# Patient Record
Sex: FEMALE | URBAN - METROPOLITAN AREA
[De-identification: names, ages, dates, MRNs, and addresses within clinical notes are randomized per-mention and may not be internally consistent; named-entity substitution may affect disease eponyms.]

---

## 2020-06-22 ENCOUNTER — OUTPATIENT (OUTPATIENT)
Dept: OUTPATIENT SERVICES | Facility: HOSPITAL | Age: 65
LOS: 1 days | End: 2020-06-22
Payer: MEDICAID

## 2020-06-22 ENCOUNTER — RESULT REVIEW (OUTPATIENT)
Age: 65
End: 2020-06-22

## 2020-06-22 DIAGNOSIS — Z17.0 ESTROGEN RECEPTOR POSITIVE STATUS [ER+]: ICD-10-CM

## 2020-06-22 DIAGNOSIS — Z80.3 FAMILY HISTORY OF MALIGNANT NEOPLASM OF BREAST: ICD-10-CM

## 2020-06-22 DIAGNOSIS — C50.011 MALIGNANT NEOPLASM OF NIPPLE AND AREOLA, RIGHT FEMALE BREAST: ICD-10-CM

## 2020-06-22 PROCEDURE — 88321 CONSLTJ&REPRT SLD PREP ELSWR: CPT

## 2020-06-24 ENCOUNTER — OUTPATIENT (OUTPATIENT)
Dept: OUTPATIENT SERVICES | Facility: HOSPITAL | Age: 65
LOS: 1 days | End: 2020-06-24
Payer: MEDICAID

## 2020-06-24 VITALS
HEIGHT: 63.5 IN | SYSTOLIC BLOOD PRESSURE: 162 MMHG | OXYGEN SATURATION: 98 % | RESPIRATION RATE: 16 BRPM | WEIGHT: 179.9 LBS | HEART RATE: 90 BPM | DIASTOLIC BLOOD PRESSURE: 94 MMHG | TEMPERATURE: 99 F

## 2020-06-24 DIAGNOSIS — Z80.3 FAMILY HISTORY OF MALIGNANT NEOPLASM OF BREAST: ICD-10-CM

## 2020-06-24 DIAGNOSIS — Z17.0 ESTROGEN RECEPTOR POSITIVE STATUS [ER+]: ICD-10-CM

## 2020-06-24 DIAGNOSIS — Z98.890 OTHER SPECIFIED POSTPROCEDURAL STATES: Chronic | ICD-10-CM

## 2020-06-24 DIAGNOSIS — C50.011 MALIGNANT NEOPLASM OF NIPPLE AND AREOLA, RIGHT FEMALE BREAST: ICD-10-CM

## 2020-06-24 DIAGNOSIS — Z01.818 ENCOUNTER FOR OTHER PREPROCEDURAL EXAMINATION: ICD-10-CM

## 2020-06-24 LAB
ANION GAP SERPL CALC-SCNC: 11 MMOL/L — SIGNIFICANT CHANGE UP (ref 5–17)
BUN SERPL-MCNC: 22 MG/DL — SIGNIFICANT CHANGE UP (ref 7–23)
CALCIUM SERPL-MCNC: 8.9 MG/DL — SIGNIFICANT CHANGE UP (ref 8.4–10.5)
CHLORIDE SERPL-SCNC: 106 MMOL/L — SIGNIFICANT CHANGE UP (ref 96–108)
CO2 SERPL-SCNC: 26 MMOL/L — SIGNIFICANT CHANGE UP (ref 22–31)
CREAT SERPL-MCNC: 0.98 MG/DL — SIGNIFICANT CHANGE UP (ref 0.5–1.3)
GLUCOSE SERPL-MCNC: 101 MG/DL — HIGH (ref 70–99)
HCT VFR BLD CALC: 36.8 % — SIGNIFICANT CHANGE UP (ref 34.5–45)
HGB BLD-MCNC: 11.9 G/DL — SIGNIFICANT CHANGE UP (ref 11.5–15.5)
MCHC RBC-ENTMCNC: 29.8 PG — SIGNIFICANT CHANGE UP (ref 27–34)
MCHC RBC-ENTMCNC: 32.3 GM/DL — SIGNIFICANT CHANGE UP (ref 32–36)
MCV RBC AUTO: 92 FL — SIGNIFICANT CHANGE UP (ref 80–100)
NRBC # BLD: 0 /100 WBCS — SIGNIFICANT CHANGE UP (ref 0–0)
PLATELET # BLD AUTO: 241 K/UL — SIGNIFICANT CHANGE UP (ref 150–400)
POTASSIUM SERPL-MCNC: 3.7 MMOL/L — SIGNIFICANT CHANGE UP (ref 3.5–5.3)
POTASSIUM SERPL-SCNC: 3.7 MMOL/L — SIGNIFICANT CHANGE UP (ref 3.5–5.3)
RBC # BLD: 4 M/UL — SIGNIFICANT CHANGE UP (ref 3.8–5.2)
RBC # FLD: 12.8 % — SIGNIFICANT CHANGE UP (ref 10.3–14.5)
SODIUM SERPL-SCNC: 143 MMOL/L — SIGNIFICANT CHANGE UP (ref 135–145)
WBC # BLD: 5.23 K/UL — SIGNIFICANT CHANGE UP (ref 3.8–10.5)
WBC # FLD AUTO: 5.23 K/UL — SIGNIFICANT CHANGE UP (ref 3.8–10.5)

## 2020-06-24 PROCEDURE — 93010 ELECTROCARDIOGRAM REPORT: CPT | Mod: NC

## 2020-06-24 PROCEDURE — 80048 BASIC METABOLIC PNL TOTAL CA: CPT

## 2020-06-24 PROCEDURE — 85027 COMPLETE CBC AUTOMATED: CPT

## 2020-06-24 PROCEDURE — 36415 COLL VENOUS BLD VENIPUNCTURE: CPT

## 2020-06-24 PROCEDURE — G0463: CPT

## 2020-06-24 PROCEDURE — 93005 ELECTROCARDIOGRAM TRACING: CPT

## 2020-06-24 NOTE — H&P PST ADULT - NSICDXPASTMEDICALHX_GEN_ALL_CORE_FT
PAST MEDICAL HISTORY:  GERD (gastroesophageal reflux disease)     Seasonal allergies PAST MEDICAL HISTORY:  GERD (gastroesophageal reflux disease)     History of herpes genitalis     HPV in female     Mitral valve prolapse     Seasonal allergies

## 2020-06-24 NOTE — H&P PST ADULT - NSANTHOSAYNRD_GEN_A_CORE
No. LUBA screening performed.  STOP BANG Legend: 0-2 = LOW Risk; 3-4 = INTERMEDIATE Risk; 5-8 = HIGH Risk/neck 13.75 inches

## 2020-06-24 NOTE — H&P PST ADULT - HISTORY OF PRESENT ILLNESS
65 y/o female presents for PST.  Patient found a lump on her right breast in 2/2020 and went for follow up PMD.  Biopsy 5/2020 showed malignancy and now scheduled for right breast wide resection, right sentinel lymph node biopsy with Dr Berry on 06/29/2020.  Scheduled for COVID 19 testing 06/26/2020 at [address] 65 y/o female presents with PMH of GERD for PST.  Patient found a lump on her right breast in 2/2020 and went for follow up PMD.  Biopsy 5/2020 showed malignancy and now scheduled for right breast wide resection, right sentinel lymph node biopsy with Dr Berry on 06/29/2020.  Scheduled for COVID 19 testing 06/26/2020 at [address]

## 2020-06-24 NOTE — H&P PST ADULT - VISION (WITH CORRECTIVE LENSES IF THE PATIENT USUALLY WEARS THEM):
Bedside and Verbal shift change report given to Hermelinda/Sister Nahun RN (oncoming nurse) by Dominic Richardson RN (offgoing nurse). Report included the following information SBAR, Kardex, Procedure Summary and MAR. Assessment completed. Patient AAOX4. Dual skin assessment completed. Bed locked and at lowest position. Call bell within reach. Normal vision: sees adequately in most situations; can see medication labels, newsprint

## 2020-06-25 DIAGNOSIS — Z01.818 ENCOUNTER FOR OTHER PREPROCEDURAL EXAMINATION: ICD-10-CM

## 2020-06-26 ENCOUNTER — APPOINTMENT (OUTPATIENT)
Dept: DISASTER EMERGENCY | Facility: CLINIC | Age: 65
End: 2020-06-26

## 2020-06-26 LAB
SARS-COV-2 N GENE NPH QL NAA+PROBE: NOT DETECTED
SURGICAL PATHOLOGY STUDY: SIGNIFICANT CHANGE UP

## 2020-06-29 ENCOUNTER — RESULT REVIEW (OUTPATIENT)
Age: 65
End: 2020-06-29

## 2020-06-29 ENCOUNTER — OUTPATIENT (OUTPATIENT)
Dept: OUTPATIENT SERVICES | Facility: HOSPITAL | Age: 65
LOS: 1 days | End: 2020-06-29
Payer: MEDICAID

## 2020-06-29 VITALS
HEIGHT: 63.5 IN | DIASTOLIC BLOOD PRESSURE: 92 MMHG | RESPIRATION RATE: 16 BRPM | SYSTOLIC BLOOD PRESSURE: 161 MMHG | HEART RATE: 91 BPM | WEIGHT: 179.9 LBS | TEMPERATURE: 99 F | OXYGEN SATURATION: 98 %

## 2020-06-29 VITALS
TEMPERATURE: 99 F | SYSTOLIC BLOOD PRESSURE: 115 MMHG | HEART RATE: 85 BPM | DIASTOLIC BLOOD PRESSURE: 80 MMHG | RESPIRATION RATE: 16 BRPM | OXYGEN SATURATION: 97 %

## 2020-06-29 DIAGNOSIS — Z98.890 OTHER SPECIFIED POSTPROCEDURAL STATES: Chronic | ICD-10-CM

## 2020-06-29 DIAGNOSIS — C50.011 MALIGNANT NEOPLASM OF NIPPLE AND AREOLA, RIGHT FEMALE BREAST: ICD-10-CM

## 2020-06-29 DIAGNOSIS — Z17.0 ESTROGEN RECEPTOR POSITIVE STATUS [ER+]: ICD-10-CM

## 2020-06-29 DIAGNOSIS — Z80.3 FAMILY HISTORY OF MALIGNANT NEOPLASM OF BREAST: ICD-10-CM

## 2020-06-29 PROCEDURE — 38900 IO MAP OF SENT LYMPH NODE: CPT | Mod: AS,RT

## 2020-06-29 PROCEDURE — 88307 TISSUE EXAM BY PATHOLOGIST: CPT | Mod: 26

## 2020-06-29 PROCEDURE — 88305 TISSUE EXAM BY PATHOLOGIST: CPT | Mod: 26

## 2020-06-29 PROCEDURE — 88333 PATH CONSLTJ SURG CYTO XM 1: CPT

## 2020-06-29 PROCEDURE — 88333 PATH CONSLTJ SURG CYTO XM 1: CPT | Mod: 26

## 2020-06-29 PROCEDURE — 38525 BIOPSY/REMOVAL LYMPH NODES: CPT | Mod: RT

## 2020-06-29 PROCEDURE — 88307 TISSUE EXAM BY PATHOLOGIST: CPT

## 2020-06-29 PROCEDURE — 88305 TISSUE EXAM BY PATHOLOGIST: CPT

## 2020-06-29 PROCEDURE — 19301 PARTIAL MASTECTOMY: CPT | Mod: RT

## 2020-06-29 PROCEDURE — 19301 PARTIAL MASTECTOMY: CPT | Mod: AS,78,RT

## 2020-06-29 RX ORDER — ESOMEPRAZOLE MAGNESIUM 40 MG/1
1 CAPSULE, DELAYED RELEASE ORAL
Qty: 0 | Refills: 0 | DISCHARGE

## 2020-06-29 RX ORDER — ASCORBIC ACID 60 MG
1 TABLET,CHEWABLE ORAL
Qty: 0 | Refills: 0 | DISCHARGE

## 2020-06-29 RX ORDER — VALACYCLOVIR 500 MG/1
0 TABLET, FILM COATED ORAL
Qty: 0 | Refills: 0 | DISCHARGE

## 2020-06-29 RX ORDER — ONDANSETRON 8 MG/1
4 TABLET, FILM COATED ORAL ONCE
Refills: 0 | Status: DISCONTINUED | OUTPATIENT
Start: 2020-06-29 | End: 2020-06-29

## 2020-06-29 RX ORDER — HYDROMORPHONE HYDROCHLORIDE 2 MG/ML
1 INJECTION INTRAMUSCULAR; INTRAVENOUS; SUBCUTANEOUS
Refills: 0 | Status: DISCONTINUED | OUTPATIENT
Start: 2020-06-29 | End: 2020-06-29

## 2020-06-29 RX ORDER — HYDROMORPHONE HYDROCHLORIDE 2 MG/ML
0.5 INJECTION INTRAMUSCULAR; INTRAVENOUS; SUBCUTANEOUS
Refills: 0 | Status: DISCONTINUED | OUTPATIENT
Start: 2020-06-29 | End: 2020-06-29

## 2020-06-29 RX ORDER — ACETAMINOPHEN 500 MG
1 TABLET ORAL
Qty: 0 | Refills: 0 | DISCHARGE

## 2020-06-29 RX ORDER — SODIUM CHLORIDE 9 MG/ML
1000 INJECTION, SOLUTION INTRAVENOUS
Refills: 0 | Status: DISCONTINUED | OUTPATIENT
Start: 2020-06-29 | End: 2020-06-29

## 2020-06-29 RX ORDER — L.ACIDOPH/B.ANIMALIS/B.LONGUM 15B CELL
1 CAPSULE ORAL
Qty: 0 | Refills: 0 | DISCHARGE

## 2020-06-29 RX ORDER — MULTIVIT-MIN/FERROUS GLUCONATE 9 MG/15 ML
1 LIQUID (ML) ORAL
Qty: 0 | Refills: 0 | DISCHARGE

## 2020-06-29 RX ORDER — FLUTICASONE PROPIONATE 50 MCG
1 SPRAY, SUSPENSION NASAL
Qty: 0 | Refills: 0 | DISCHARGE

## 2020-06-29 RX ADMIN — SODIUM CHLORIDE 50 MILLILITER(S): 9 INJECTION, SOLUTION INTRAVENOUS at 09:59

## 2020-06-29 NOTE — ASU DISCHARGE PLAN (ADULT/PEDIATRIC) - NURSING INSTRUCTIONS
All discharge instructions reviewed with patient including pain ,safety, medication . care and emptying of J{P drain and follow up care. Pt acknowledges understanding of discharge instructions

## 2020-06-29 NOTE — BRIEF OPERATIVE NOTE - NSICDXBRIEFPROCEDURE_GEN_ALL_CORE_FT
PROCEDURES:  Partial mastectomy of right breast with sentinel lymph node biopsy 29-Jun-2020 13:14:48 right axillary dissection Marleny Lopez  Resection of right breast 29-Jun-2020 13:13:05 wide resection right breast Marleny Lopez

## 2020-06-29 NOTE — BRIEF OPERATIVE NOTE - NSICDXBRIEFPOSTOP_GEN_ALL_CORE_FT
POST-OP DIAGNOSIS:  Breast cancer 29-Jun-2020 13:12:20 rght breast intraductal carcinoma Marleny Lopez

## 2020-06-29 NOTE — ASU PATIENT PROFILE, ADULT - PMH
GERD (gastroesophageal reflux disease)    History of herpes genitalis    HPV in female    Mitral valve prolapse    Seasonal allergies

## 2020-06-29 NOTE — ASU DISCHARGE PLAN (ADULT/PEDIATRIC) - ASU DC SPECIAL INSTRUCTIONSFT
Keep dressings dry for 48 hours .  Remove outer dressings leave steri strips in place.  Wear bra at all times for at least 2 weeks .    Empty and record drainage - make an appointment for Dr Berry when ELTON drain is less than 30 cc in   24 hours.  Call office for appoinment .  Do not wet drain site.  Take medications as directed. - Drink plenty of fluids and take a stool softener or laxative while   taking a narcotic .  Ice pack to breast for comfort as needed

## 2020-06-29 NOTE — ASU DISCHARGE PLAN (ADULT/PEDIATRIC) - CARE PROVIDER_API CALL
Aura Berry AND [facility]  [address], [address]  [place] [address]  Phone: [phone]  Fax: [phone]  Follow Up Time: 2 weeks

## 2020-07-02 LAB — SURGICAL PATHOLOGY STUDY: SIGNIFICANT CHANGE UP

## 2020-07-10 ENCOUNTER — OUTPATIENT (OUTPATIENT)
Dept: OUTPATIENT SERVICES | Facility: HOSPITAL | Age: 65
LOS: 1 days | Discharge: HOME | End: 2020-07-10
Payer: MEDICAID

## 2020-07-10 ENCOUNTER — RESULT REVIEW (OUTPATIENT)
Age: 65
End: 2020-07-10

## 2020-07-10 ENCOUNTER — TRANSCRIPTION ENCOUNTER (OUTPATIENT)
Age: 65
End: 2020-07-10

## 2020-07-10 DIAGNOSIS — Z98.890 OTHER SPECIFIED POSTPROCEDURAL STATES: Chronic | ICD-10-CM

## 2020-07-10 DIAGNOSIS — C50.919 MALIGNANT NEOPLASM OF UNSPECIFIED SITE OF UNSPECIFIED FEMALE BREAST: ICD-10-CM

## 2020-07-13 ENCOUNTER — APPOINTMENT (OUTPATIENT)
Dept: HEMATOLOGY ONCOLOGY | Facility: CLINIC | Age: 65
End: 2020-07-13
Payer: MEDICAID

## 2020-07-13 DIAGNOSIS — C50.911 MALIGNANT NEOPLASM OF UNSPECIFIED SITE OF RIGHT FEMALE BREAST: ICD-10-CM

## 2020-07-13 DIAGNOSIS — Z86.79 PERSONAL HISTORY OF OTHER DISEASES OF THE CIRCULATORY SYSTEM: ICD-10-CM

## 2020-07-13 DIAGNOSIS — Z80.1 FAMILY HISTORY OF MALIGNANT NEOPLASM OF TRACHEA, BRONCHUS AND LUNG: ICD-10-CM

## 2020-07-13 DIAGNOSIS — Z78.9 OTHER SPECIFIED HEALTH STATUS: ICD-10-CM

## 2020-07-13 DIAGNOSIS — Z17.0 MALIGNANT NEOPLASM OF UNSPECIFIED SITE OF RIGHT FEMALE BREAST: ICD-10-CM

## 2020-07-13 PROBLEM — K21.9 GASTRO-ESOPHAGEAL REFLUX DISEASE WITHOUT ESOPHAGITIS: Chronic | Status: ACTIVE | Noted: 2020-06-24

## 2020-07-13 PROBLEM — Z86.19 PERSONAL HISTORY OF OTHER INFECTIOUS AND PARASITIC DISEASES: Chronic | Status: ACTIVE | Noted: 2020-06-24

## 2020-07-13 PROBLEM — J30.2 OTHER SEASONAL ALLERGIC RHINITIS: Chronic | Status: ACTIVE | Noted: 2020-06-24

## 2020-07-13 PROBLEM — I34.1 NONRHEUMATIC MITRAL (VALVE) PROLAPSE: Chronic | Status: ACTIVE | Noted: 2020-06-24

## 2020-07-13 PROBLEM — B97.7 PAPILLOMAVIRUS AS THE CAUSE OF DISEASES CLASSIFIED ELSEWHERE: Chronic | Status: ACTIVE | Noted: 2020-06-24

## 2020-07-13 LAB — SURGICAL PATHOLOGY STUDY: SIGNIFICANT CHANGE UP

## 2020-07-13 PROCEDURE — 99205 OFFICE O/P NEW HI 60 MIN: CPT | Mod: 95

## 2020-07-14 PROBLEM — Z80.1 FAMILY HISTORY OF LUNG CANCER: Status: ACTIVE | Noted: 2020-07-14

## 2020-07-14 PROBLEM — Z78.9 NON-SMOKER: Status: ACTIVE | Noted: 2020-07-14

## 2020-07-14 PROBLEM — Z86.79 HISTORY OF MITRAL VALVE PROLAPSE: Status: RESOLVED | Noted: 2020-07-14 | Resolved: 2020-07-14

## 2020-07-14 RX ORDER — ALPRAZOLAM 0.25 MG/1
0.25 TABLET ORAL
Qty: 2 | Refills: 0 | Status: ACTIVE | COMMUNITY
Start: 2020-06-04

## 2020-07-14 RX ORDER — MULTIVITAMIN/IRON/FOLIC ACID 18MG-0.4MG
500-400 TABLET ORAL
Refills: 0 | Status: ACTIVE | COMMUNITY

## 2020-07-14 RX ORDER — FLUTICASONE PROPIONATE 50 UG/1
50 SPRAY, METERED NASAL
Refills: 0 | Status: ACTIVE | COMMUNITY

## 2020-07-14 RX ORDER — MULTIVIT-MIN/IRON/FOLIC ACID/K 18-600-40
500 CAPSULE ORAL
Refills: 0 | Status: ACTIVE | COMMUNITY

## 2020-07-14 RX ORDER — ECHINACEA 400 MG
CAPSULE ORAL
Refills: 0 | Status: ACTIVE | COMMUNITY

## 2020-07-14 RX ORDER — TRAMADOL HYDROCHLORIDE 50 MG/1
50 TABLET, COATED ORAL
Qty: 20 | Refills: 0 | Status: ACTIVE | COMMUNITY
Start: 2020-06-24

## 2020-07-14 RX ORDER — VALACYCLOVIR 500 MG/1
500 TABLET, FILM COATED ORAL
Qty: 30 | Refills: 0 | Status: ACTIVE | COMMUNITY
Start: 2020-03-19

## 2020-07-14 RX ORDER — MULTIVITAMIN,THER AND MINERALS
TABLET ORAL
Refills: 0 | Status: ACTIVE | COMMUNITY

## 2020-07-15 NOTE — PHYSICAL EXAM
[Fully active, able to carry on all pre-disease performance without restriction] : Status 0 - Fully active, able to carry on all pre-disease performance without restriction [Normal] : affect appropriate [de-identified] : normal respiratory effort, able to speak full sentences without gasping or wheezing

## 2020-07-15 NOTE — CONSULT LETTER
[Dear  ___] : Dear  [unfilled], [Consult Letter:] : I had the pleasure of evaluating your patient, [unfilled]. [( Thank you for referring [unfilled] for consultation for _____ )] : Thank you for referring [unfilled] for consultation for [unfilled] [Please see my note below.] : Please see my note below. [Sincerely,] : Sincerely, [Consult Closing:] : Thank you very much for allowing me to participate in the care of this patient.  If you have any questions, please do not hesitate to contact me. [DrCaitie  ___] : Dr. ELISE [FreeTextEntry2] : Héctor Barreto MD\par [address]\par [place] [id number] [FreeTextEntry3] : Greg Huddleston MD\par Attending\par [facility]\par

## 2020-07-15 NOTE — HISTORY OF PRESENT ILLNESS
[Home] : at home, [unfilled] , at the time of the visit. [Medical Office: ([facility])___] : at the medical office located in  [Verbal consent obtained from patient] : the patient, [unfilled] [Disease: _____________________] : Disease: [unfilled] [T: ___] : T[unfilled] [N: ___] : N[unfilled] [AJCC Stage: ____] : AJCC Stage: [unfilled] [de-identified] : Age 64: right breast cancer\par Palpable right breast lump: she underwent diagnostic R mammogram on 11/4/56 which showed no definite abnormality but u/s showed hypoechoic nodule in the right breast: 7:00 13 x 4 mm nodule. Breast biopsy done 5/21/2020 showed infiltrating ductal carcinoma that was ER positive, LA positive, and Mtd9zjy positive: 3+. She had breast excision on 5/26/2020 which showed infiltrating ductal carcinoma measuring at least 1.5 cm. MRI of the breast done on 6/10/2020 which showed retroareolar right breast enhancement at site of biopsy proven carcinoma and no suspicious enhancement of the left breast or axillary adenopathy. She underwent lumpectomy with sentinel lymph node biopsy and axillary lymph node dissection with Dr Berry on 6/29/2020. Pathology showed focal invasive residual ductal carcinoma 0.2 cm, grade 3, positive sentinel lymph node: 0.9 cm with additional lymph nodes 6 negative for carcinoma, and  focal DCIS.  [de-identified] : invasive ductal carcinoma ER positive, AZ positive, Udf2eyy positive  [de-identified] : Due to coronavirus pandemic, telehealth visit made to decrease patient exposure. She consented to telehealth visit.\par She has been recovering from surgery. She has been healthy with FH of lung cancer but parents were smokers. She has MVP but denies any palpitations or heart issues. She denies any recent Echo. She is present to review adjuvant recommendations. She will also get a second opinion with Dr Zahira Menard.  [de-identified] : Huma Genetic testing done on 6/17/2020 was negative

## 2020-07-15 NOTE — ASSESSMENT
[FreeTextEntry1] : 65 y/o  BRCA negative F with Stage IA (T1N1) right breast cancer that is ER positive, MS positive, Dfl2ldu negative. We reviewed her pathology and reviewed the significance of ER positive/ Dxr7ijg positive/ LN positive breast cancer. We reviewed the role of chemotherapy with Tyy4yah blocking medication to improve disease free survival. We reviewed different Ywe8xol blocking regimens but recommended TCHP given her LN positive disease.  We would not recommend ACTHP since she only has one positive LN. We would recommend TCHP as adjuvant chemotherapy if her EF from Echo is WNL.  We reviewed TCHP every 3 weeks for 6 cycles followed by maintenance trastuzumab/ pertuzumab therapy. We reviewed the potential side effects of chemotherapy including but not limited to: fatigue, low blood counts, allergic reaction, nausea, vomiting, diarrhea, arthralgias, hair loss, numbness and tingling of the hands and feet, increased risk of infection, and low potassium/ magnesium. We reviewed the risk of cardiotoxicity and how we would monitor heart function baseline and every 3 months. We reviewed modifications to therapy if she does not tolerate TCHP. Questions were answered to her satisfaction. She understands role of adjuvant chemotherapy and agreeable. We will order 2 D Echo. She will have second opinion and let us know where she would like to treat. \par \par We reviewed sequencing of therapy: chemotherapy followed by radiation followed by endocrine therapy. We reviewed AI therapy: once a day for 5 to 10 years. We reviewed potential side effects. Questions answered to her satisfaction.

## 2020-07-16 ENCOUNTER — RESULT REVIEW (OUTPATIENT)
Age: 65
End: 2020-07-16

## 2020-07-16 PROCEDURE — 88321 CONSLTJ&REPRT SLD PREP ELSWR: CPT

## 2020-07-17 LAB — SURGICAL PATHOLOGY STUDY: SIGNIFICANT CHANGE UP

## 2020-07-21 ENCOUNTER — TRANSCRIPTION ENCOUNTER (OUTPATIENT)
Age: 65
End: 2020-07-21

## 2020-07-23 ENCOUNTER — APPOINTMENT (OUTPATIENT)
Dept: CARDIOLOGY | Facility: CLINIC | Age: 65
End: 2020-07-23

## 2024-11-11 NOTE — H&P PST ADULT - NEGATIVE BREAST SYMPTOMS
100% of the time/able to follow single-step instructions
no nipple discharge R/no breast lump L/no nipple discharge L/no breast lump R
